# Patient Record
Sex: FEMALE | Race: WHITE | NOT HISPANIC OR LATINO | Employment: FULL TIME | URBAN - METROPOLITAN AREA
[De-identification: names, ages, dates, MRNs, and addresses within clinical notes are randomized per-mention and may not be internally consistent; named-entity substitution may affect disease eponyms.]

---

## 2019-03-06 ENCOUNTER — ROUTINE PRENATAL (OUTPATIENT)
Dept: PERINATAL CARE | Facility: CLINIC | Age: 34
End: 2019-03-06

## 2019-03-06 VITALS
SYSTOLIC BLOOD PRESSURE: 108 MMHG | WEIGHT: 153 LBS | HEIGHT: 66 IN | BODY MASS INDEX: 24.59 KG/M2 | HEART RATE: 84 BPM | DIASTOLIC BLOOD PRESSURE: 60 MMHG

## 2019-03-06 DIAGNOSIS — Z3A.27 27 WEEKS GESTATION OF PREGNANCY: ICD-10-CM

## 2019-03-06 DIAGNOSIS — O44.00 PLACENTA PREVIA ANTEPARTUM: Primary | ICD-10-CM

## 2019-03-06 DIAGNOSIS — IMO0002 EVALUATE ANATOMY NOT SEEN ON PRIOR SONOGRAM: ICD-10-CM

## 2019-03-06 DIAGNOSIS — Z36.89 ENCOUNTER FOR ULTRASOUND TO CHECK FETAL GROWTH: ICD-10-CM

## 2019-03-06 NOTE — PATIENT INSTRUCTIONS
Thank you for choosing [facility] [id number] for your  care today  If you have any questions about your ultrasound or care, please do not hesitate to contact us or your primary obstetrician  At this time, no additional ultrasounds are advised through the  center, however, if your doctors would like you to have any additional ultrasounds, they will let us know

## 2019-03-06 NOTE — PROGRESS NOTES
P[facility] [place]: Ms Elise Stevens was seen today at 27w2d for followup placental location ultrasound with fetal growth measurement  See ultrasound report under "OB Procedures" tab  Please don't hesitate to contact our office with any concerns or questions    Radha Jarrell MD

## 2025-08-11 ENCOUNTER — HOSPITAL ENCOUNTER (OUTPATIENT)
Age: 40
Discharge: HOME | End: 2025-08-11
Attending: NURSE PRACTITIONER
Payer: COMMERCIAL

## 2025-08-13 PROBLEM — R53.83 FATIGUE: Status: ACTIVE | Noted: 2025-08-13

## 2025-08-13 PROBLEM — L30.9 ECZEMA: Status: ACTIVE | Noted: 2025-08-13

## 2025-08-13 PROBLEM — H91.92 HEARING LOSS IN LEFT EAR: Status: ACTIVE | Noted: 2025-08-13

## 2025-08-14 ENCOUNTER — OB ABSTRACT (OUTPATIENT)
Age: 40
End: 2025-08-14

## 2025-08-18 ENCOUNTER — ANNUAL EXAM (OUTPATIENT)
Age: 40
End: 2025-08-18
Payer: COMMERCIAL

## 2025-08-18 VITALS
DIASTOLIC BLOOD PRESSURE: 70 MMHG | WEIGHT: 148.2 LBS | SYSTOLIC BLOOD PRESSURE: 102 MMHG | BODY MASS INDEX: 23.82 KG/M2 | HEIGHT: 66 IN

## 2025-08-18 DIAGNOSIS — Z01.419 WELL WOMAN EXAM WITH ROUTINE GYNECOLOGICAL EXAM: Primary | ICD-10-CM

## 2025-08-18 PROCEDURE — S0612 ANNUAL GYNECOLOGICAL EXAMINA: HCPCS | Performed by: NURSE PRACTITIONER

## 2025-08-18 RX ORDER — SERTRALINE HYDROCHLORIDE 25 MG/1
1 TABLET, FILM COATED ORAL DAILY
COMMUNITY
Start: 2025-07-24

## 2025-08-18 RX ORDER — DIPHENOXYLATE HYDROCHLORIDE AND ATROPINE SULFATE 2.5; .025 MG/1; MG/1
1 TABLET ORAL DAILY
COMMUNITY

## 2025-08-18 RX ORDER — PIMECROLIMUS 10 MG/G
CREAM TOPICAL 2 TIMES DAILY PRN
COMMUNITY
Start: 2025-05-16